# Patient Record
Sex: FEMALE | Race: WHITE | NOT HISPANIC OR LATINO | Employment: FULL TIME | RURAL
[De-identification: names, ages, dates, MRNs, and addresses within clinical notes are randomized per-mention and may not be internally consistent; named-entity substitution may affect disease eponyms.]

---

## 2022-03-21 ENCOUNTER — OFFICE VISIT (OUTPATIENT)
Dept: FAMILY MEDICINE CLINIC | Facility: CLINIC | Age: 32
End: 2022-03-21

## 2022-03-21 VITALS
SYSTOLIC BLOOD PRESSURE: 130 MMHG | RESPIRATION RATE: 10 BRPM | HEART RATE: 79 BPM | WEIGHT: 169.2 LBS | TEMPERATURE: 98.6 F | HEIGHT: 63 IN | BODY MASS INDEX: 29.98 KG/M2 | OXYGEN SATURATION: 99 % | DIASTOLIC BLOOD PRESSURE: 78 MMHG

## 2022-03-21 DIAGNOSIS — R00.1 BRADYCARDIA: Primary | ICD-10-CM

## 2022-03-21 DIAGNOSIS — Z13.220 SCREENING FOR CHOLESTEROL LEVEL: ICD-10-CM

## 2022-03-21 DIAGNOSIS — Z13.1 SCREENING FOR DIABETES MELLITUS: ICD-10-CM

## 2022-03-21 DIAGNOSIS — Z76.89 ESTABLISHING CARE WITH NEW DOCTOR, ENCOUNTER FOR: ICD-10-CM

## 2022-03-21 DIAGNOSIS — Z11.59 NEED FOR HEPATITIS C SCREENING TEST: ICD-10-CM

## 2022-03-21 PROCEDURE — 99204 OFFICE O/P NEW MOD 45 MIN: CPT | Performed by: FAMILY MEDICINE

## 2022-03-21 PROCEDURE — 93000 ELECTROCARDIOGRAM COMPLETE: CPT | Performed by: FAMILY MEDICINE

## 2022-03-21 NOTE — PROGRESS NOTES
"Chief Complaint  Slow Heart Rate     History of Present Illness  Zoila Lilly presents today to establish care and discuss bradycardia.  She was a previous patient of Dr. Muhammad, has not seen a doctor probably in about 5 years.  Patient reports since last October at night her heart rate via her \"apple watch\" will alert her, that her heart rate is at a low rate which has been around 32-38 at the lowest.  This occurs maybe 3-4 times a month. Other times around 40-50 bpm on a typical night.    No symptoms would not know if watch had not given her an alert and woke her up.   Doing light exercise on a fairly regular basis.  Usually walking or Yoga trying to do 3 times a week. No ACHARYA. No palpitations. When check pulse will occasionally skip a beat.     Pt states there hasn't been any pain or discomfort associated with it, and the lowest heart rate is almost exclusively she is laying down typically when she is asleep.    Patient has no other concerns at this time.      MGF developed A-fib at age of 91. No other family history of heart disease or arhythmia that she is aware of NO MI or Stroke.    No current outpatient medications on file prior to visit.     No current facility-administered medications on file prior to visit.       Objective   Vital Signs:   /78   Pulse 79   Temp 98.6 °F (37 °C)   Resp 10   Ht 160 cm (63\")   Wt 76.7 kg (169 lb 3.2 oz)   SpO2 99%   BMI 29.97 kg/m²       Physical Exam  Vitals and nursing note reviewed.   Constitutional:       General: She is not in acute distress.     Appearance: Normal appearance. She is well-developed. She is not ill-appearing.   HENT:      Head: Normocephalic and atraumatic.   Eyes:      Conjunctiva/sclera: Conjunctivae normal.      Pupils: Pupils are equal, round, and reactive to light.   Neck:      Thyroid: No thyromegaly.      Vascular: No JVD.   Cardiovascular:      Rate and Rhythm: Normal rate and regular rhythm.      Pulses: Normal pulses.      " Heart sounds: Normal heart sounds. No murmur heard.  Pulmonary:      Effort: Pulmonary effort is normal.      Breath sounds: Normal breath sounds. No wheezing or rales.   Musculoskeletal:         General: Normal range of motion.      Cervical back: Normal range of motion.      Right lower leg: No edema.      Left lower leg: No edema.   Lymphadenopathy:      Cervical: No cervical adenopathy.   Skin:     General: Skin is warm and dry.      Findings: No rash.   Neurological:      Mental Status: She is alert and oriented to person, place, and time.   Psychiatric:         Mood and Affect: Mood normal.         Behavior: Behavior normal.            No visits with results within 1 Day(s) from this visit.   Latest known visit with results is:   No results found for any previous visit.             No results found for: HGBA1C      ECG 12 Lead    Date/Time: 3/21/2022 9:47 AM  Performed by: Marybel Rose MD  Authorized by: Marybel Rose MD   Comparison: not compared with previous ECG   Previous ECG: no previous ECG available  Rhythm: sinus rhythm  Rate: normal  BPM: 76  Conduction: conduction normal  ST Segments: ST segments normal  T Waves: T waves normal  QRS axis: normal  Other findings comments: Short SC interval  Clinical impression comment: Borderline EKG                Assessment and Plan    Diagnoses and all orders for this visit:    1. Bradycardia (Primary)  -     CBC & Differential  -     TSH  -     T4, Free  -     ECG 12 Lead    2. Establishing care with new doctor, encounter for    3. Screening for diabetes mellitus  -     Comprehensive Metabolic Panel    4. Screening for cholesterol level  -     Lipid Panel    5. Need for hepatitis C screening test  -     Hepatitis C Antibody      Asymptomatic nocturnal bradycardia.  EKG today shows short SC interval but otherwise normal.  Placing Holter monitor for further information i.e. to look  for arrhythmias or pauses.  May need referral to cardiology  for further evaluation.      There are no discontinued medications.      Follow Up     Return in about 2 weeks (around 4/4/2022) for Annual physical with Pap and  review Holter and review labs.    Patient was given instructions and counseling regarding her condition or for health maintenance advice. Please see specific information pulled into the AVS if appropriate.

## 2022-03-22 ENCOUNTER — CLINICAL SUPPORT (OUTPATIENT)
Dept: FAMILY MEDICINE CLINIC | Facility: CLINIC | Age: 32
End: 2022-03-22

## 2022-03-22 DIAGNOSIS — R00.1 BRADYCARDIA: Primary | ICD-10-CM

## 2022-03-22 LAB
ALBUMIN SERPL-MCNC: 4.5 G/DL (ref 3.8–4.8)
ALBUMIN/GLOB SERPL: 1.6 {RATIO} (ref 1.2–2.2)
ALP SERPL-CCNC: 114 IU/L (ref 44–121)
ALT SERPL-CCNC: 30 IU/L (ref 0–32)
AST SERPL-CCNC: 17 IU/L (ref 0–40)
BASOPHILS # BLD AUTO: 0 X10E3/UL (ref 0–0.2)
BASOPHILS NFR BLD AUTO: 1 %
BILIRUB SERPL-MCNC: 0.7 MG/DL (ref 0–1.2)
BUN SERPL-MCNC: 11 MG/DL (ref 6–20)
BUN/CREAT SERPL: 16 (ref 9–23)
CALCIUM SERPL-MCNC: 9.2 MG/DL (ref 8.7–10.2)
CHLORIDE SERPL-SCNC: 102 MMOL/L (ref 96–106)
CHOLEST SERPL-MCNC: 213 MG/DL (ref 100–199)
CO2 SERPL-SCNC: 23 MMOL/L (ref 20–29)
CREAT SERPL-MCNC: 0.67 MG/DL (ref 0.57–1)
EGFRCR SERPLBLD CKD-EPI 2021: 120 ML/MIN/1.73
EOSINOPHIL # BLD AUTO: 0 X10E3/UL (ref 0–0.4)
EOSINOPHIL NFR BLD AUTO: 0 %
ERYTHROCYTE [DISTWIDTH] IN BLOOD BY AUTOMATED COUNT: 13.4 % (ref 11.7–15.4)
GLOBULIN SER CALC-MCNC: 2.9 G/DL (ref 1.5–4.5)
GLUCOSE SERPL-MCNC: 89 MG/DL (ref 65–99)
HCT VFR BLD AUTO: 42.3 % (ref 34–46.6)
HCV AB S/CO SERPL IA: <0.1 S/CO RATIO (ref 0–0.9)
HDLC SERPL-MCNC: 57 MG/DL
HGB BLD-MCNC: 13.8 G/DL (ref 11.1–15.9)
IMM GRANULOCYTES # BLD AUTO: 0 X10E3/UL (ref 0–0.1)
IMM GRANULOCYTES NFR BLD AUTO: 0 %
LDLC SERPL CALC-MCNC: 127 MG/DL (ref 0–99)
LYMPHOCYTES # BLD AUTO: 2.1 X10E3/UL (ref 0.7–3.1)
LYMPHOCYTES NFR BLD AUTO: 38 %
MCH RBC QN AUTO: 27.8 PG (ref 26.6–33)
MCHC RBC AUTO-ENTMCNC: 32.6 G/DL (ref 31.5–35.7)
MCV RBC AUTO: 85 FL (ref 79–97)
MONOCYTES # BLD AUTO: 0.3 X10E3/UL (ref 0.1–0.9)
MONOCYTES NFR BLD AUTO: 5 %
NEUTROPHILS # BLD AUTO: 3.2 X10E3/UL (ref 1.4–7)
NEUTROPHILS NFR BLD AUTO: 56 %
PLATELET # BLD AUTO: 249 X10E3/UL (ref 150–450)
POTASSIUM SERPL-SCNC: 4 MMOL/L (ref 3.5–5.2)
PROT SERPL-MCNC: 7.4 G/DL (ref 6–8.5)
RBC # BLD AUTO: 4.97 X10E6/UL (ref 3.77–5.28)
SODIUM SERPL-SCNC: 139 MMOL/L (ref 134–144)
T4 FREE SERPL-MCNC: 1.02 NG/DL (ref 0.82–1.77)
TRIGL SERPL-MCNC: 164 MG/DL (ref 0–149)
TSH SERPL DL<=0.005 MIU/L-ACNC: 2.53 UIU/ML (ref 0.45–4.5)
VLDLC SERPL CALC-MCNC: 29 MG/DL (ref 5–40)
WBC # BLD AUTO: 5.6 X10E3/UL (ref 3.4–10.8)

## 2022-03-24 PROBLEM — R00.1 BRADYCARDIA: Status: ACTIVE | Noted: 2022-03-24

## 2022-03-24 PROBLEM — R94.31 ABNORMAL HOLTER EXAM: Status: ACTIVE | Noted: 2022-03-24

## 2022-03-24 PROBLEM — R94.31: Status: ACTIVE | Noted: 2022-03-24

## 2022-03-24 PROBLEM — R94.31 ST SEGMENT DEPRESSION: Status: ACTIVE | Noted: 2022-03-24

## 2022-03-25 ENCOUNTER — OFFICE VISIT (OUTPATIENT)
Dept: FAMILY MEDICINE CLINIC | Facility: CLINIC | Age: 32
End: 2022-03-25

## 2022-03-25 ENCOUNTER — TELEPHONE (OUTPATIENT)
Dept: FAMILY MEDICINE CLINIC | Facility: CLINIC | Age: 32
End: 2022-03-25

## 2022-03-25 VITALS
SYSTOLIC BLOOD PRESSURE: 127 MMHG | RESPIRATION RATE: 18 BRPM | HEART RATE: 98 BPM | DIASTOLIC BLOOD PRESSURE: 80 MMHG | WEIGHT: 168 LBS | BODY MASS INDEX: 29.77 KG/M2 | HEIGHT: 63 IN | OXYGEN SATURATION: 98 % | TEMPERATURE: 98 F

## 2022-03-25 DIAGNOSIS — R94.31 ST SEGMENT DEPRESSION: ICD-10-CM

## 2022-03-25 DIAGNOSIS — R94.31 ABNORMAL HOLTER EXAM: ICD-10-CM

## 2022-03-25 DIAGNOSIS — R00.1 BRADYCARDIA: Primary | ICD-10-CM

## 2022-03-25 DIAGNOSIS — R94.31 SHORT QT INTERVAL ON ECG: ICD-10-CM

## 2022-03-25 PROCEDURE — 99213 OFFICE O/P EST LOW 20 MIN: CPT | Performed by: FAMILY MEDICINE

## 2022-03-25 NOTE — TELEPHONE ENCOUNTER
----- Message from Marybel Rose MD sent at 3/22/2022  1:19 PM EDT -----  Zoila,  Cholesterol is a little above ideal nothing to be overly concerned about.  Do recommend a low cholesterol diet and increase physical activity.  Other labs are completely within normal limits  Brief look at your Holter report looks really good.  It did show PVCs a benign cause of skipped heartbeats, no other arrhythmias, no pauses.  Did catch very brief episodes of bradycardia with heart rate down to 48 lasting for less than a minute.  Will discuss this in further detail at appointment scheduled on 25th.  Marybel Rose MD

## 2022-03-25 NOTE — PROGRESS NOTES
"Chief Complaint  holter result     History of Present Illness  Zoila Lilly presents today to follow-up on Holter monitor performed for nocturnal bradycardia and short QT interval.  Patient denied any current symptoms of palpitation, dyspnea, chest pain, syncope or presyncope.  Did not have any additional work from her watch regarding nocturnal bradycardia    No current outpatient medications on file prior to visit.     No current facility-administered medications on file prior to visit.       Objective   Vital Signs:   /80   Pulse 98   Temp 98 °F (36.7 °C) (Temporal)   Resp 18   Ht 160 cm (63\")   Wt 76.2 kg (168 lb)   SpO2 98%   BMI 29.76 kg/m²       Physical Exam  Vitals and nursing note reviewed.   Constitutional:       General: She is not in acute distress.     Appearance: Normal appearance. She is well-developed. She is not ill-appearing.   HENT:      Head: Normocephalic and atraumatic.   Eyes:      Conjunctiva/sclera: Conjunctivae normal.      Pupils: Pupils are equal, round, and reactive to light.   Neck:      Thyroid: No thyromegaly.      Vascular: No JVD.   Cardiovascular:      Rate and Rhythm: Normal rate and regular rhythm.      Pulses: Normal pulses.      Heart sounds: Normal heart sounds. No murmur heard.  Pulmonary:      Effort: Pulmonary effort is normal.      Breath sounds: Normal breath sounds. No wheezing or rales.   Musculoskeletal:         General: Normal range of motion.      Cervical back: Normal range of motion.      Right lower leg: No edema.      Left lower leg: No edema.   Lymphadenopathy:      Cervical: No cervical adenopathy.   Skin:     General: Skin is warm and dry.      Findings: No rash.   Neurological:      Mental Status: She is alert and oriented to person, place, and time.   Psychiatric:         Mood and Affect: Mood normal.         Behavior: Behavior normal.            No visits with results within 1 Day(s) from this visit.   Latest known visit with results " is:   Office Visit on 03/21/2022   Component Date Value Ref Range Status   • Glucose 03/21/2022 89  65 - 99 mg/dL Final   • BUN 03/21/2022 11  6 - 20 mg/dL Final   • Creatinine 03/21/2022 0.67  0.57 - 1.00 mg/dL Final   • EGFR Result 03/21/2022 120  >59 mL/min/1.73 Final   • BUN/Creatinine Ratio 03/21/2022 16  9 - 23 Final   • Sodium 03/21/2022 139  134 - 144 mmol/L Final   • Potassium 03/21/2022 4.0  3.5 - 5.2 mmol/L Final   • Chloride 03/21/2022 102  96 - 106 mmol/L Final   • Total CO2 03/21/2022 23  20 - 29 mmol/L Final   • Calcium 03/21/2022 9.2  8.7 - 10.2 mg/dL Final   • Total Protein 03/21/2022 7.4  6.0 - 8.5 g/dL Final   • Albumin 03/21/2022 4.5  3.8 - 4.8 g/dL Final   • Globulin 03/21/2022 2.9  1.5 - 4.5 g/dL Final   • A/G Ratio 03/21/2022 1.6  1.2 - 2.2 Final   • Total Bilirubin 03/21/2022 0.7  0.0 - 1.2 mg/dL Final   • Alkaline Phosphatase 03/21/2022 114  44 - 121 IU/L Final   • AST (SGOT) 03/21/2022 17  0 - 40 IU/L Final   • ALT (SGPT) 03/21/2022 30  0 - 32 IU/L Final   • Total Cholesterol 03/21/2022 213 (A) 100 - 199 mg/dL Final   • Triglycerides 03/21/2022 164 (A) 0 - 149 mg/dL Final   • HDL Cholesterol 03/21/2022 57  >39 mg/dL Final   • VLDL Cholesterol Brad 03/21/2022 29  5 - 40 mg/dL Final   • LDL Chol Calc (NIH) 03/21/2022 127 (A) 0 - 99 mg/dL Final   • WBC 03/21/2022 5.6  3.4 - 10.8 x10E3/uL Final   • RBC 03/21/2022 4.97  3.77 - 5.28 x10E6/uL Final   • Hemoglobin 03/21/2022 13.8  11.1 - 15.9 g/dL Final   • Hematocrit 03/21/2022 42.3  34.0 - 46.6 % Final   • MCV 03/21/2022 85  79 - 97 fL Final   • MCH 03/21/2022 27.8  26.6 - 33.0 pg Final   • MCHC 03/21/2022 32.6  31.5 - 35.7 g/dL Final   • RDW 03/21/2022 13.4  11.7 - 15.4 % Final   • Platelets 03/21/2022 249  150 - 450 x10E3/uL Final   • Neutrophil Rel % 03/21/2022 56  Not Estab. % Final   • Lymphocyte Rel % 03/21/2022 38  Not Estab. % Final   • Monocyte Rel % 03/21/2022 5  Not Estab. % Final   • Eosinophil Rel % 03/21/2022 0  Not Estab. % Final    • Basophil Rel % 03/21/2022 1  Not Estab. % Final   • Neutrophils Absolute 03/21/2022 3.2  1.4 - 7.0 x10E3/uL Final   • Lymphocytes Absolute 03/21/2022 2.1  0.7 - 3.1 x10E3/uL Final   • Monocytes Absolute 03/21/2022 0.3  0.1 - 0.9 x10E3/uL Final   • Eosinophils Absolute 03/21/2022 0.0  0.0 - 0.4 x10E3/uL Final   • Basophils Absolute 03/21/2022 0.0  0.0 - 0.2 x10E3/uL Final   • Immature Granulocyte Rel % 03/21/2022 0  Not Estab. % Final   • Immature Grans Absolute 03/21/2022 0.0  0.0 - 0.1 x10E3/uL Final   • TSH 03/21/2022 2.530  0.450 - 4.500 uIU/mL Final   • Free T4 03/21/2022 1.02  0.82 - 1.77 ng/dL Final   • Hep C Virus Ab 03/21/2022 <0.1  0.0 - 0.9 s/co ratio Final    Comment:                                   Negative:     < 0.8                               Indeterminate: 0.8 - 0.9                                    Positive:     > 0.9   The CDC recommends that a positive HCV antibody result   be followed up with a HCV Nucleic Acid Amplification   test (182152).         Lab Results   Component Value Date    BUN 11 03/21/2022    CREATININE 0.67 03/21/2022     03/21/2022    K 4.0 03/21/2022     03/21/2022    CALCIUM 9.2 03/21/2022    ALBUMIN 4.5 03/21/2022    BILITOT 0.7 03/21/2022    ALKPHOS 114 03/21/2022    AST 17 03/21/2022    ALT 30 03/21/2022    CHLPL 213 (H) 03/21/2022    TRIG 164 (H) 03/21/2022    HDL 57 03/21/2022    VLDL 29 03/21/2022     (H) 03/21/2022    WBC 5.6 03/21/2022    RBC 4.97 03/21/2022    HCT 42.3 03/21/2022    MCV 85 03/21/2022    MCH 27.8 03/21/2022    TSH 2.530 03/21/2022    FREET4 1.02 03/21/2022        No results found for: HGBA1C             Assessment and Plan    Diagnoses and all orders for this visit:    1. Bradycardia (Primary)  -     Ambulatory Referral to Cardiology    2. Abnormal Holter exam  -     Ambulatory Referral to Cardiology    3. Short QT interval on ECG  -     Ambulatory Referral to Cardiology    4. ST segment depression        Abnormal Holter with  very frequent PVCs and ST depression.  Brief bradycardia.  Referring to electrophysiologist,  for further evaluation    Cholesterol is a little above ideal   Do recommend a low cholesterol diet and increase physical activity.  Other labs are completely within normal limits    sThere are no discontinued medications.      Follow Up     Return in about 4 weeks (around 4/22/2022) for Annual physical with pap.    Patient was given instructions and counseling regarding her condition or for health maintenance advice. Please see specific information pulled into the AVS if appropriate.

## 2022-04-01 ENCOUNTER — PATIENT ROUNDING (BHMG ONLY) (OUTPATIENT)
Dept: CARDIOLOGY | Facility: CLINIC | Age: 32
End: 2022-04-01

## 2022-04-01 ENCOUNTER — OFFICE VISIT (OUTPATIENT)
Dept: CARDIOLOGY | Facility: CLINIC | Age: 32
End: 2022-04-01

## 2022-04-01 VITALS
DIASTOLIC BLOOD PRESSURE: 89 MMHG | HEART RATE: 71 BPM | SYSTOLIC BLOOD PRESSURE: 139 MMHG | BODY MASS INDEX: 29.59 KG/M2 | HEIGHT: 63 IN | WEIGHT: 167 LBS | OXYGEN SATURATION: 100 %

## 2022-04-01 DIAGNOSIS — R00.1 BRADYCARDIA: Primary | ICD-10-CM

## 2022-04-01 DIAGNOSIS — I49.3 PVC'S (PREMATURE VENTRICULAR CONTRACTIONS): ICD-10-CM

## 2022-04-01 PROCEDURE — 99202 OFFICE O/P NEW SF 15 MIN: CPT | Performed by: INTERNAL MEDICINE

## 2022-04-01 NOTE — PROGRESS NOTES
A My-Chart message has been sent to the patient for PATIENT ROUNDING with [facility]

## 2022-04-01 NOTE — PROGRESS NOTES
HP      Name: Zoila Lilly ADMIT: (Not on file)   : 1990  PCP: Marybel Rose MD    MRN: [id number] LOS: 0 days   AGE/SEX: 31 y.o. female  ROOM: Room/bed info not found     Chief Complaint   Patient presents with   • Consult     ABNORMAL HOLTER       Subjective        History of present illness  Zoila Lilly is a 31-year-old female patient with no prior history of any cardiac issues, patient had a Holter monitor due to slow heart rate seen on her apple watch and the Holter monitor also showed PVCs as well as episodes of bradycardia and therefore she is here for evaluation.  Patient is doing well denies having any chest pain or shortness of breath, no palpitations no lower extremity edema no syncopal episodes, she exercises with no limitations.    Past Medical History:   Diagnosis Date   • Abnormal ECG 3-    Short pr interval   • Chicken pox      History reviewed. No pertinent surgical history.  Family History   Problem Relation Age of Onset   • Cancer Maternal Grandmother         lung cancer   • Cancer Paternal Grandmother         breast cancer   • Heart disease Neg Hx    • Hypertension Neg Hx      Social History     Tobacco Use   • Smoking status: Never Smoker   • Smokeless tobacco: Never Used   Vaping Use   • Vaping Use: Never used   Substance Use Topics   • Alcohol use: Yes     Alcohol/week: 5.0 standard drinks     Types: 5 Glasses of wine per week   • Drug use: Never     (Not in a hospital admission)    Allergies:  Patient has no known allergies.    Review of systems    Constitutional: Negative.    Respiratory and cardiovascular: As detailed in HPI section.  Gastrointestinal: Negative for constipation, nausea and vomiting negative for abdominal distention, abdominal pain and diarrhea.   Genitourinary: Negative for difficulty urinating and flank pain.   Musculoskeletal: Negative for arthralgias, joint swelling and myalgias.   Skin: Negative for color change, rash and  wound.   Neurological: Negative for dizziness, syncope, weakness and headaches.   Hematological: Negative for adenopathy.   Psychiatric/Behavioral: Negative for confusion.   All other systems reviewed and are negative.    Physical Exam  VITALS REVIEWED    General:      well developed, in no acute distress.    Head:      normocephalic and atraumatic.    Eyes:      PERRL/EOM intact, conjunctiva and sclera clear with out nystagmus.    Neck:      no masses, thyromegaly,  trachea central with normal respiratory effort and PMI displaced laterally  Lungs:      Clear to auscultation bilaterally  Heart:       regular rate and rhythm  Msk:      no deformity or scoliosis noted of thoracic or lumbar spine.    Pulses:      pulses normal in all 4 extremities.    Extremities:       no lower extremity edema  Neurologic:      no focal deficits.   alert oriented x3  Skin:      intact without lesions or rashes.    Psych:      alert and cooperative; normal mood and affect; normal attention span and concentration.      Result Review :               Pertinent cardiac workup    1. EKG 3/21/2020 sinus rhythm normal EKG  2. Holter monitor on 3/21/2022 for 24 hours showed sinus rhythm throughout, short episode of bradycardia around midnight noted.  PVCs at 11%      Procedures        Assessment and Plan      Zoila Lilly is a 31-year-old female patient who is here for bradycardia.  Patient does not have any symptoms of shortness of breath or lightheadedness, her resting heart rate today was 71.  The Holter monitor showed nighttime bradycardia so does her apple watch.  She did have PVCs at 11% but they are asymptomatic and she does not feel them.  Nighttime bradycardia is a benign condition and does not need any specific therapy for, also since her PVCs are asymptomatic I do not think she will benefit from beta-blockers or calcium channel blockers.  I will see her on as-needed basis.  Patient agrees with the outlined assessment and  all her questions were answered to her satisfaction.    Diagnoses and all orders for this visit:    1. Bradycardia (Primary)    2. PVC's (premature ventricular contractions)           Return if symptoms worsen or fail to improve.  Patient was given instructions and counseling regarding her condition or for health maintenance advice. Please see specific information pulled into the AVS if appropriate.

## 2022-05-10 ENCOUNTER — OFFICE VISIT (OUTPATIENT)
Dept: FAMILY MEDICINE CLINIC | Facility: CLINIC | Age: 32
End: 2022-05-10

## 2022-05-10 VITALS
TEMPERATURE: 98.4 F | HEART RATE: 86 BPM | RESPIRATION RATE: 14 BRPM | OXYGEN SATURATION: 100 % | SYSTOLIC BLOOD PRESSURE: 128 MMHG | BODY MASS INDEX: 30.19 KG/M2 | WEIGHT: 170.4 LBS | HEIGHT: 63 IN | DIASTOLIC BLOOD PRESSURE: 90 MMHG

## 2022-05-10 DIAGNOSIS — Z00.00 ANNUAL PHYSICAL EXAM: Primary | ICD-10-CM

## 2022-05-10 DIAGNOSIS — Z12.4 PAP SMEAR FOR CERVICAL CANCER SCREENING: ICD-10-CM

## 2022-05-10 DIAGNOSIS — Z91.89: ICD-10-CM

## 2022-05-10 PROCEDURE — 99395 PREV VISIT EST AGE 18-39: CPT | Performed by: FAMILY MEDICINE

## 2022-05-10 RX ORDER — VITAMIN A ACETATE, .BETA.-CAROTENE, ASCORBIC ACID, CHOLECALCIFEROL, .ALPHA.-TOCOPHEROL ACETATE, DL-, THIAMINE MONONITRATE, RIBOFLAVIN, NIACINAMIDE, PYRIDOXINE HYDROCHLORIDE, FOLIC ACID, CYANOCOBALAMIN, CALCIUM CARBONATE, FERROUS FUMARATE, ZINC OXIDE, AND CUPRIC OXIDE 2000; 2000; 120; 400; 22; 1.84; 3; 20; 10; 1; 12; 200; 27; 25; 2 [IU]/1; [IU]/1; MG/1; [IU]/1; MG/1; MG/1; MG/1; MG/1; MG/1; MG/1; UG/1; MG/1; MG/1; MG/1; MG/1
1 TABLET ORAL DAILY
Qty: 30 TABLET | Refills: 12
Start: 2022-05-10

## 2022-05-16 LAB
CYTOLOGIST CVX/VAG CYTO: NORMAL
CYTOLOGY CVX/VAG DOC CYTO: NORMAL
CYTOLOGY CVX/VAG DOC THIN PREP: NORMAL
DX ICD CODE: NORMAL
HIV 1 & 2 AB SER-IMP: NORMAL
OTHER STN SPEC: NORMAL
STAT OF ADQ CVX/VAG CYTO-IMP: NORMAL